# Patient Record
Sex: MALE | Race: WHITE | NOT HISPANIC OR LATINO | Employment: FULL TIME | RURAL
[De-identification: names, ages, dates, MRNs, and addresses within clinical notes are randomized per-mention and may not be internally consistent; named-entity substitution may affect disease eponyms.]

---

## 2024-03-11 ENCOUNTER — OFFICE VISIT (OUTPATIENT)
Dept: CARDIOLOGY | Facility: CLINIC | Age: 59
End: 2024-03-11
Payer: COMMERCIAL

## 2024-03-11 VITALS
WEIGHT: 219.8 LBS | HEART RATE: 83 BPM | SYSTOLIC BLOOD PRESSURE: 144 MMHG | BODY MASS INDEX: 36.62 KG/M2 | DIASTOLIC BLOOD PRESSURE: 84 MMHG | HEIGHT: 65 IN

## 2024-03-11 DIAGNOSIS — E66.01 MORBID (SEVERE) OBESITY DUE TO EXCESS CALORIES: ICD-10-CM

## 2024-03-11 DIAGNOSIS — I10 HYPERTENSION, ESSENTIAL: Primary | ICD-10-CM

## 2024-03-11 DIAGNOSIS — F17.200 SMOKER: ICD-10-CM

## 2024-03-11 PROCEDURE — 99204 OFFICE O/P NEW MOD 45 MIN: CPT | Performed by: INTERNAL MEDICINE

## 2024-03-11 PROCEDURE — 99406 BEHAV CHNG SMOKING 3-10 MIN: CPT | Performed by: INTERNAL MEDICINE

## 2024-03-11 RX ORDER — LOSARTAN POTASSIUM 100 MG/1
1 TABLET ORAL DAILY
COMMUNITY

## 2024-03-11 RX ORDER — AMLODIPINE BESYLATE 10 MG/1
1 TABLET ORAL DAILY
COMMUNITY

## 2024-03-11 NOTE — PROGRESS NOTES
Chief Complaint  new patient (/), Palpitations, and Dizziness    Subjective            Alex Lemus presents to [facility] CARDIOLOGY  Palpitations   Associated symptoms include chest pain and dizziness.   Dizziness  Associated symptoms include chest pain.       58-year-old white male.  He has no significant cardiac problems.  He has recently been started on blood pressure medication and titration by primary care.  He was having some palpitations and dizziness intermittently.  He recently underwent a Holter monitor which was negative, echo which showed no structural abnormalities and stress imaging which was negative for ischemia.    PMH  Past Medical History:   Diagnosis Date    Hypertension          SURGICALHX  History reviewed. No pertinent surgical history.     SOC  Social History     Socioeconomic History    Marital status:    Tobacco Use    Smoking status: Some Days     Types: Cigarettes    Smokeless tobacco: Former     Types: Chew   Vaping Use    Vaping status: Never Used   Substance and Sexual Activity    Alcohol use: Yes    Drug use: Never    Sexual activity: Defer         FAMHX  Family History   Problem Relation Age of Onset    Hypertension Father           ALLERGY  No Known Allergies     MEDSCURRENT    Current Outpatient Medications:     amLODIPine (NORVASC) 10 MG tablet, Take 1 tablet by mouth Daily., Disp: , Rfl:     losartan (COZAAR) 100 MG tablet, Take 1 tablet by mouth Daily., Disp: , Rfl:       Review of Systems   Constitutional: Negative.   HENT: Negative.     Eyes: Negative.    Cardiovascular:  Positive for chest pain and palpitations.   Respiratory: Negative.     Endocrine: Negative.    Hematologic/Lymphatic: Negative.    Skin: Negative.    Musculoskeletal:  Positive for back pain.   Gastrointestinal: Negative.    Genitourinary: Negative.    Neurological:  Positive for dizziness and light-headedness.   Psychiatric/Behavioral: Negative.          Objective     /84    "Pulse 83   Ht 165.1 cm (65\")   Wt 99.7 kg (219 lb 12.8 oz)   BMI 36.58 kg/m²       General Appearance:   well developed  well nourished  HENT:   oropharynx moist  lips not cyanotic  Neck:  thyroid not enlarged  supple  Respiratory:  no respiratory distress  normal breath sounds  no rales  Cardiovascular:  no jugular venous distention  regular rhythm  apical impulse normal  S1 normal, S2 normal  no S3, no S4   no murmur  no rub, no thrill  carotid pulses normal; no bruit  pedal pulses normal  lower extremity edema: none    Musculoskeletal:  no clubbing of fingers.   normocephalic, head atraumatic  Skin:   warm, dry  Psychiatric:  judgement and insight appropriate  normal mood and affect      Result Review :             Data reviewed : Primary care records reviewed, TSH normal, , CMP normal, CBC normal stress imaging, echo, Holter reviewed as above    Procedures      Alex Lemus  reports that he has been smoking cigarettes. He has quit using smokeless tobacco.  His smokeless tobacco use included chew.. I have educated him on the risk of diseases from using tobacco products such as cancer, COPD, and heart disease.     I advised him to quit and he is willing to quit. We have discussed the following method/s for tobacco cessation:  Counseling.  Together we have set a quit date for  when he weans down to 0 cigarettes .  He will follow up with me in several months or sooner to check on his progress.    I spent 3  minutes counseling the patient.                Assessment and Plan        ASSESSMENT:  Encounter Diagnoses   Name Primary?    Hypertension, essential Yes    Morbid (severe) obesity due to excess calories          PLAN:    1.  Essential hypertension-mildly uncontrolled today.  I agree with his current medical therapy.  I recommend first and foremost that he go low-sodium with his nutrition and make attempts to lose weight.  I outlined an approach to his nutrition, including modest calorie restriction " with 1800 sammi/day to try and promote 1 pound per week of weight loss.  Otherwise continue current medical therapy and monitor his blood pressures at home.  If over the next few months it is not improving we can add an additional medication.  His recent cardiac workup was low risk  2.  Morbid obesity due to excess calories-counseled  3.  Smoking-counseled          Patient was given instructions and counseling regarding his condition or for health maintenance advice. Please see specific information pulled into the AVS if appropriate.             LANI Franks MD  3/11/2024    15:29 EDT